# Patient Record
Sex: FEMALE | URBAN - METROPOLITAN AREA
[De-identification: names, ages, dates, MRNs, and addresses within clinical notes are randomized per-mention and may not be internally consistent; named-entity substitution may affect disease eponyms.]

---

## 2021-12-09 ENCOUNTER — OFFICE VISIT (OUTPATIENT)
Dept: URBAN - METROPOLITAN AREA CLINIC 63 | Facility: CLINIC | Age: 26
End: 2021-12-09
Payer: COMMERCIAL

## 2021-12-09 DIAGNOSIS — D23.121 OTHER BENIGN NEOPLASM SKIN/ LEFT UPPER EYELID, INC CANTHUS: Primary | ICD-10-CM

## 2021-12-09 PROCEDURE — 92002 INTRM OPH EXAM NEW PATIENT: CPT | Performed by: OPTOMETRIST

## 2021-12-09 ASSESSMENT — INTRAOCULAR PRESSURE
OD: 16
OS: 17

## 2021-12-09 ASSESSMENT — KERATOMETRY
OD: 46.50
OS: 46.88

## 2021-12-09 NOTE — IMPRESSION/PLAN
Impression: Other benign neoplasm skin/ left upper eyelid, inc canthus: D23.121. Plan: Refer to Oculoplastics for Tx.